# Patient Record
Sex: FEMALE | Employment: FULL TIME | URBAN - METROPOLITAN AREA
[De-identification: names, ages, dates, MRNs, and addresses within clinical notes are randomized per-mention and may not be internally consistent; named-entity substitution may affect disease eponyms.]

---

## 2021-04-27 ENCOUNTER — HOSPITAL ENCOUNTER (OUTPATIENT)
Dept: PHYSICAL THERAPY | Age: 51
Discharge: HOME | End: 2021-04-27
Payer: MEDICAID

## 2021-04-27 PROCEDURE — 97162 PT EVAL MOD COMPLEX 30 MIN: CPT
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
## 2021-04-27 NOTE — PROGRESS NOTES
PHYSICAL THERAPY - DAILY TREATMENT NOTE     Patient Name: Michael Sheets        Date: 2021  : 1970   YES Patient  Verified  Visit #:   1     Insurance: Payor: Aixa De Jesus / Plan: Aliza Ko / Product Type: Managed Care Medicaid /      In time: 900 Out time: 1000   Total Treatment Time: 60     Medicare/[facility] Time Tracking (below)   Total Timed Codes (min):   1:1 Treatment Time:       TREATMENT AREA =  Neck pain [M54.2]  Back pain [M54.9]  Concussion [S06.0X9A]    SUBJECTIVE    Pain Level (on 0 to 10 scale):  7  / 10   Medication Changes/New allergies or changes in medical history, any new surgeries or procedures?     NO    If yes, update Summary List   Subjective Functional Status/Changes:  []  No changes reported       See POC         OBJECTIVE  Modalities Rationale:     decrease pain and increase tissue extensibility to improve patient's ability to perform ADL's w/o pain      min [] Estim, type/location:                                      []  att     []  unatt     []  w/US     []  w/ice    []  w/heat    min []  Mechanical Traction: type/lbs                   []  pro   []  sup   []  int   []  cont    []  before manual    []  after manual    min []  Ultrasound, settings/location:      min []  Iontophoresis w/ dexamethasone, location:                                               []  take home patch       []  in clinic   10 min []  Ice     [x]  Heat    location/position: Supine to C/S and L/S    min []  Vasopneumatic Device, press/temp:     min []  Other:    [x] Skin assessment post-treatment (if applicable):    [x]  intact    [x]  redness- no adverse reaction     []redness - adverse reaction:      Billed With/As:   [] TE   [] TA   [] Neuro   [] Self Care Patient Education: [x] Review HEP    [] Progressed/Changed HEP based on:   [] positioning   [] body mechanics   [] transfers   [] heat/ice application    [] other:        Other Objective/Functional Measures:    See POC  See TE Flow sheet     Post Treatment Pain Level (on 0 to 10) scale:   4-5  / 10     ASSESSMENT    Assessment/Changes in Function:     See POC     []  See Progress Note/Recertification   Patient will continue to benefit from skilled PT services to modify and progress therapeutic interventions, address functional mobility deficits, address ROM deficits, address strength deficits, analyze and address soft tissue restrictions, analyze and cue movement patterns, analyze and modify body mechanics/ergonomics and assess and modify postural abnormalities to attain remaining goals.       Progress toward goals / Updated goals:    See POC     PLAN    [x]  Upgrade activities as tolerated YES Continue plan of care   []  Discharge due to :    []  Other:      Therapist: Cece Mathews PT    Date: 4/27/2021 Time: 8:28 AM     Future Appointments   Date Time Provider Trey An   4/27/2021  9:00 AM Hope Curtis PT [facility]

## 2021-05-03 ENCOUNTER — HOSPITAL ENCOUNTER (OUTPATIENT)
Dept: PHYSICAL THERAPY | Age: 51
Discharge: HOME | End: 2021-05-03
Payer: MEDICAID

## 2021-05-03 PROCEDURE — 97112 NEUROMUSCULAR REEDUCATION: CPT

## 2021-05-03 PROCEDURE — 97110 THERAPEUTIC EXERCISES: CPT

## 2021-05-03 PROCEDURE — 97535 SELF CARE MNGMENT TRAINING: CPT

## 2021-05-06 ENCOUNTER — HOSPITAL ENCOUNTER (OUTPATIENT)
Dept: PHYSICAL THERAPY | Age: 51
End: 2021-05-06
Payer: MEDICAID

## 2021-05-07 ENCOUNTER — HOSPITAL ENCOUNTER (OUTPATIENT)
Dept: PHYSICAL THERAPY | Age: 51
Discharge: HOME | End: 2021-05-07
Payer: MEDICAID

## 2021-05-07 PROCEDURE — 97530 THERAPEUTIC ACTIVITIES: CPT

## 2021-05-07 PROCEDURE — 97110 THERAPEUTIC EXERCISES: CPT

## 2021-05-07 PROCEDURE — 97112 NEUROMUSCULAR REEDUCATION: CPT

## 2021-05-07 NOTE — PROGRESS NOTES
PHYSICAL THERAPY - DAILY TREATMENT NOTE     Patient Name: Lo Bee        Date: 2021  : 1970   YES Patient  Verified  Visit #:   3   of   12  Insurance: Payor: Mandy Day / Plan: Camron Sample / Product Type: Managed Care Medicaid /      In time: 11:50 Out time: 12:40   Total Treatment Time: 50       TREATMENT AREA =  Neck pain [M54.2]  Back pain [M54.9]  Concussion [S06.0X9A]    SUBJECTIVE    Pain Level (on 0 to 10 scale):  6  / 10   Medication Changes/New allergies or changes in medical history, any new surgeries or procedures? NO    If yes, update Summary List   Subjective Functional Status/Changes:  []  No changes reported     \"I had a bad night last night and it really hurts today and it's really stiff. \"   Functional improvements:   Functional impairments: pain, ADLs,   Subjective Functional Status/Changes:  [x]  No changes reported       OBJECTIVE  Modalities Rationale:   decrease inflammation and decrease pain to improve patient's ability to amb without pain      min [] Estim, type/location:                                      []  att     []  unatt     []  w/US     []  w/ice    []  w/heat    min []  Mechanical Traction: type/lbs                   []  pro   []  sup   []  int   []  cont    []  before manual    []  after manual    min []  Ultrasound, settings/location:      min []  Iontophoresis w/ dexamethasone, location:                                               []  take home patch       []  in clinic   10 min []  Ice     [x]  Heat    location/position: Hooklying, back, neck    min []  Vasopneumatic Device, press/temp:     min []  Other:    [x] Skin assessment post-treatment (if applicable):    [x]  intact    []  redness- no adverse reaction     []redness  adverse reaction:      22 min Therapeutic Exercise:  [x]  See flow sheet   Rationale:  increase ROM and increase strength to improve the patients ability to participate in ADLs without pain     10 min Therapeutic Activity: [x]  See flow sheet   Rationale:  increase ROM and increase strength to improve the patients ability to participate in ADLs without pain     8 min Neuromuscular Re-ed: [x]  See flow sheet   Rationale:  increase strength and improve coordination to improve the patients ability to participate in ADLs without pain         Billed With/As:   [x] TE   [] TA   [] Neuro   [] Self Care Patient Education: [x] Review HEP    [] Progressed/Changed HEP based on:   [] positioning   [] body mechanics   [] transfers   [] heat/ice application    [] other:        Other Objective/Functional Measures: Added in ulnar nerve glides today     Post Treatment Pain Level (on 0 to 10) scale:   4  / 10     ASSESSMENT    Assessment/Changes in Function:     Difficulty tolerating lumbar stretching activities today, stated that they increased her pain significantly  Pt reports seeing doctor to manage concussion symptoms which are still affecting her daily and significantly     []  See Progress Note/Recertification   Patient will continue to benefit from skilled PT services to modify and progress therapeutic interventions, address functional mobility deficits, address ROM deficits, address strength deficits, analyze and address soft tissue restrictions and analyze and cue movement patterns to attain remaining goals.       Progress toward goals / Updated goals:    Fair Progress to    [x] STG    [] LTG  1 as shown by pain levels     PLAN    [x]  Upgrade activities as tolerated YES Continue plan of care   []  Discharge due to :    []  Other:      Therapist: Jennifer Doe PT    Date: 4/7/9621 Time: 12:00 PM     Future Appointments   Date Time Provider Trey An   5/10/2021  3:45 PM Robie Crook [facility]   5/13/2021  3:45 PM Krysten Hemming, PTA [facility]   5/17/2021  3:45 PM Robie Crook [facility]   5/20/2021  3:45 PM Krysten Hemming, PTA [facility]   5/24/2021  3:45 PM Robie Crook [facility]   5/27/2021 4:30 PM Mohsen Mcfarland, MALU [facility] [address]

## 2021-05-13 ENCOUNTER — HOSPITAL ENCOUNTER (OUTPATIENT)
Dept: PHYSICAL THERAPY | Age: 51
End: 2021-05-13
Payer: MEDICAID

## 2021-05-14 ENCOUNTER — HOSPITAL ENCOUNTER (OUTPATIENT)
Dept: PHYSICAL THERAPY | Age: 51
Discharge: HOME | End: 2021-05-14
Payer: MEDICAID

## 2021-05-14 PROCEDURE — 97112 NEUROMUSCULAR REEDUCATION: CPT

## 2021-05-14 PROCEDURE — 97530 THERAPEUTIC ACTIVITIES: CPT

## 2021-05-14 PROCEDURE — 97014 ELECTRIC STIMULATION THERAPY: CPT

## 2021-05-14 PROCEDURE — 97110 THERAPEUTIC EXERCISES: CPT

## 2021-05-14 NOTE — PROGRESS NOTES
PHYSICAL THERAPY - DAILY TREATMENT NOTE     Patient Name: Jacy Enter        Date: 2021  : 1970   YES Patient  Verified  Visit #:     Insurance: Payor: Viola Roach / Plan: Sruthi Bergman / Product Type: Managed Care Medicaid /      In time: 11:18 Out time: [id number]   Total Treatment Time: 58       TREATMENT AREA =  Neck pain [M54.2]  Back pain [M54.9]  Concussion [S06.0X9A]    SUBJECTIVE    Pain Level (on 0 to 10 scale): 7    / 10   Medication Changes/New allergies or changes in medical history, any new surgeries or procedures?     NO    If yes, update Summary List   Subjective Functional Status/Changes:  []  No changes reported       Functional improvements: walked ~ 4 blocks (20 min), decreasing frequency of HA   Functional impairments:difficulty sleeping- waking frequently, increased low back pain ,vision still slightly blurrie         OBJECTIVE  Modalities Rationale:   decrease inflammation and decrease pain to improve patient's ability to amb without pain     10 min [x] Estim, type/location: IFC low back;  -CS                                     []  att     [x]  unatt     []  w/US     []  w/ice    [x]  w/heat    min []  Mechanical Traction: type/lbs                   []  pro   []  sup   []  int   []  cont    []  before manual    []  after manual    min []  Ultrasound, settings/location:      min []  Iontophoresis w/ dexamethasone, location:                                               []  take home patch       []  in clinic    min []  Ice     []  Heat    location/position:     min []  Vasopneumatic Device, press/temp:     min []  Other:    [x] Skin assessment post-treatment (if applicable):    [x]  intact    []  redness- no adverse reaction     []redness  adverse reaction:      23 min Therapeutic Exercise:  [x]  See flow sheet   Rationale:  increase ROM and increase strength to improve the patients ability to participate in ADLs without pain     9 min Therapeutic Activity: [x]  See flow sheet   Rationale:  increase ROM and increase strength to improve the patients ability to participate in ADLs without pain     16 min Neuromuscular Re-ed: [x]  See flow sheet   Rationale:  increase strength and improve coordination to improve the patients ability to participate in ADLs without pain         10Billed With/As:   [x] TE   [] TA   [] Neuro   [] Self Care Patient Education: [x] Review HEP    [] Progressed/Changed HEP based on:   [] positioning   [] body mechanics   [] transfers   [] heat/ice application    [] other:        Other Objective/Functional Measures:  AROM: RR: 30%-pain, LR: 40%     Post Treatment Pain Level (on 0 to 10) scale:  5   / 10     ASSESSMENT    Assessment/Changes in Function:   Pt presenting with moderate restirictions in cervical AROM bilateral cervical rotation  Add IFC with MH to low back to decreased mm tone/pain  Pt requires extra time for position changes secondary to muscle guarding     []  See Progress Note/Recertification   Patient will continue to benefit from skilled PT services to modify and progress therapeutic interventions, address functional mobility deficits, address ROM deficits, address strength deficits, analyze and address soft tissue restrictions and analyze and cue movement patterns to attain remaining goals.       Progress toward goals / Updated goals:    Fair Progress to    [x] STG    [] LTG       PLAN    [x]  Upgrade activities as tolerated YES Continue plan of care   []  Discharge due to :    []  Other:      Therapist: Ninfa Barragan PTA    Date: 5/14/2021 Time: 12:15 pm     Future Appointments   Date Time Provider Trey An   5/14/2021 11:15 AM Cynthia Shaw PTA [facility]   5/17/2021  3:45 PM Oscar Eland [facility]   5/20/2021  3:45 PM Kurtis Colbert PTA [facility]   5/24/2021  3:45 PM Oscar Eland [facility]   5/27/2021  4:30 PM Cynthia Shaw PTA [facility]

## 2021-05-17 ENCOUNTER — HOSPITAL ENCOUNTER (OUTPATIENT)
Dept: PHYSICAL THERAPY | Age: 51
Discharge: HOME | End: 2021-05-17
Payer: MEDICAID

## 2021-05-17 PROCEDURE — 97014 ELECTRIC STIMULATION THERAPY: CPT

## 2021-05-17 PROCEDURE — 97110 THERAPEUTIC EXERCISES: CPT

## 2021-05-17 PROCEDURE — 97530 THERAPEUTIC ACTIVITIES: CPT

## 2021-05-17 PROCEDURE — 97112 NEUROMUSCULAR REEDUCATION: CPT

## 2021-05-17 NOTE — PROGRESS NOTES
PHYSICAL THERAPY - DAILY TREATMENT NOTE    Patient Name: Jacy Enter        Date: 2021  : 1970    Patient  Verified: YES  Visit #:   5   of   12  Insurance: Payor: Viola Roach / Plan: Sruthi Bergman / Product Type: Managed Care Medicaid /      In time: 3:50 Out time: 4:55   Total Treatment Time: 65     Medicare Time Tracking (below)   Total Timed Codes (min):  - 1:1 Treatment Time:  -     TREATMENT AREA/ DIAGNOSIS = Neck pain [M54.2]  Back pain [M54.9]  Concussion [S06.0X9A]    SUBJECTIVE  Pain Level (on 0 to 10 scale):  7  / 10   Medication Changes/New allergies or changes in medical history, any new surgeries or procedures?     NO    If yes, update Summary List   Subjective Functional Status/Changes:  []  No changes reported     Pt reports having pain in the low back with ADLs      OBJECTIVE  Modalities Rationale:     decrease inflammation and decrease pain to improve patient's ability to perform ADLs without pain    10 min [x] Estim, type/location: IFC L/S                                     []  att     []  unatt     []  w/US     []  w/ice    []  w/heat    min []  Mechanical Traction: type/lbs                   []  pro   []  sup   []  int   []  cont    []  before manual    []  after manual    min []  Ultrasound, settings/location:      min []  Iontophoresis w/ dexamethasone, location:                                               []  take home patch       []  in clinic   - min []  Ice     [x]  Heat    location/position:     min []  Vasopneumatic Device, press/temp:     min []  Other:    [] Skin assessment post-treatment (if applicable):    []  intact    []  redness- no adverse reaction     []redness  adverse reaction:        20 min Therapeutic Exercise:  [x]  See flow sheet   Rationale:      increase ROM and increase strength to improve the patients ability to perform ADLs without pain       15 min Therapeutic Activity: [x]  See flow sheet   Rationale:    increase strength and improve coordination to improve the patients ability to perform ADLs without pain      20 min Neuromuscular Re-ed: [x]  See flow sheet   Rationale:    improve balance and increase proprioception to improve the patients ability to perform ADLs without pain      Billed With/As:   [x] TE   [] TA   [] Neuro   [] Self Care Patient Education: [x] Review HEP    [] Progressed/Changed HEP based on:   [] positioning   [] body mechanics   [] transfers   [] heat/ice application    [] other:        Other Objective/Functional Measures:    TTP to L/S. Post Treatment Pain Level (on 0 to 10) scale:   5  / 10     ASSESSMENT  Assessment/Changes in Function:     Pt still requires cueing for proper mechanics with exercises. []  See Progress Note/Recertification   Patient will continue to benefit from skilled PT services to modify and progress therapeutic interventions, address functional mobility deficits, address ROM deficits, address strength deficits, analyze and address soft tissue restrictions, analyze and cue movement patterns and analyze and modify body mechanics/ergonomics to attain remaining goals.    Progress toward goals / Updated goals:    Good Progress to    [] STG    [x] LTG  1 as shown by improved overall activity tolerance      PLAN  [x]  Upgrade activities as tolerated YES Continue plan of care   []  Discharge due to :    []  Other:      Therapist: Eli Mc DPT     Date: 5/17/2021 Time: 4:14 PM        Future Appointments   Date Time Provider Trey An   5/20/2021  3:45 PM Adele Landa [facility]   5/24/2021  3:45 PM Vanita Chen [facility]   5/27/2021  4:30 PM Evelin Lambert PTA [facility]

## 2021-05-20 ENCOUNTER — HOSPITAL ENCOUNTER (OUTPATIENT)
Dept: PHYSICAL THERAPY | Age: 51
Discharge: HOME | End: 2021-05-20
Payer: MEDICAID

## 2021-05-20 PROCEDURE — 97535 SELF CARE MNGMENT TRAINING: CPT

## 2021-05-20 PROCEDURE — 97110 THERAPEUTIC EXERCISES: CPT

## 2021-05-20 PROCEDURE — 97014 ELECTRIC STIMULATION THERAPY: CPT

## 2021-05-20 PROCEDURE — 97112 NEUROMUSCULAR REEDUCATION: CPT

## 2021-05-20 NOTE — PROGRESS NOTES
PHYSICAL THERAPY - DAILY TREATMENT NOTE    Patient Name: Christina Bush        Date: 2021  : 1970    Patient  Verified: YES  Visit #:   6   of   12  Insurance: Payor: Uriah Boothe / Plan: Kristen Miles / Product Type: Managed Care Medicaid /      In time: 3:45 Out time: 4:40   Total Treatment Time: 55     Medicare Time Tracking (below)   Total Timed Codes (min):  - 1:1 Treatment Time:  -     TREATMENT AREA/ DIAGNOSIS = Neck pain [M54.2]  Back pain [M54.9]  Concussion [S06.0X9A]    SUBJECTIVE  Pain Level (on 0 to 10 scale):  3  / 10   Medication Changes/New allergies or changes in medical history, any new surgeries or procedures? NO    If yes, update Summary List   Subjective Functional Status/Changes:  []  No changes reported     Pt reports overall improvement in pain today.  Pt states she is able to perform more ADLs      OBJECTIVE  Modalities Rationale:     decrease inflammation and decrease pain to improve patient's ability to perform ADLs without pain    10 min [x] Estim, type/location:  IFC to L/S                                    []  att     []  unatt     []  w/US     []  w/ice    []  w/heat    min []  Mechanical Traction: type/lbs                   []  pro   []  sup   []  int   []  cont    []  before manual    []  after manual    min []  Ultrasound, settings/location:      min []  Iontophoresis w/ dexamethasone, location:                                               []  take home patch       []  in clinic   - min [x]  Ice     []  Heat    location/position: C/S and L/S with IFC    min []  Vasopneumatic Device, press/temp:     min []  Other:    [] Skin assessment post-treatment (if applicable):    []  intact    []  redness- no adverse reaction     []redness  adverse reaction:        15 min Therapeutic Exercise:  [x]  See flow sheet   Rationale:      increase ROM and increase strength to improve the patients ability to perform ADLs without pain         15 min Neuromuscular Re-ed: [x]  See flow sheet   Rationale:    improve coordination and increase proprioception to improve the patients ability to perform ADLs without pain      15 min Self Care: Pathology education. Pain neuroscience education. Rationale:    education to improve the patients ability to self manage symptoms     Billed With/As:   [x] TE   [] TA   [] Neuro   [] Self Care Patient Education: [x] Review HEP    [] Progressed/Changed HEP based on:   [] positioning   [] body mechanics   [] transfers   [] heat/ice application    [] other:        Other Objective/Functional Measures:    Improved TrA activation   Post Treatment Pain Level (on 0 to 10) scale:   3  / 10     ASSESSMENT  Assessment/Changes in Function:     Improved overall activity tolerance with exercises and decrease in pain level     []  See Progress Note/Recertification   Patient will continue to benefit from skilled PT services to modify and progress therapeutic interventions, address functional mobility deficits, address ROM deficits, address strength deficits, analyze and address soft tissue restrictions and analyze and cue movement patterns to attain remaining goals.    Progress toward goals / Updated goals:    Good Progress to    [] STG    [x] LTG  1 as shown by improved overall pain levels with ADLs     PLAN  [x]  Upgrade activities as tolerated YES Continue plan of care   []  Discharge due to :    []  Other:      Therapist: Daryle Peach ,DPT     Date: 5/20/2021 Time: 9:42 AM        Future Appointments   Date Time Provider Trey An   5/20/2021  3:45 PM Cecillia Durand [facility]   5/24/2021  3:45 PM Cecillia Durand [facility]   5/27/2021  4:30 PM Cindy Key PTA [facility]

## 2021-05-24 ENCOUNTER — HOSPITAL ENCOUNTER (OUTPATIENT)
Dept: PHYSICAL THERAPY | Age: 51
Discharge: HOME | End: 2021-05-24
Payer: MEDICAID

## 2021-05-24 PROCEDURE — 97110 THERAPEUTIC EXERCISES: CPT

## 2021-05-24 PROCEDURE — 97112 NEUROMUSCULAR REEDUCATION: CPT

## 2021-05-24 PROCEDURE — 97530 THERAPEUTIC ACTIVITIES: CPT

## 2021-05-24 PROCEDURE — 97014 ELECTRIC STIMULATION THERAPY: CPT

## 2021-05-24 NOTE — PROGRESS NOTES
PHYSICAL THERAPY - DAILY TREATMENT NOTE    Patient Name: Royal Oro        Date: 2021  : 1970    Patient  Verified: YES  Visit #:   7   of   12  Insurance: Payor: Manasa Baker / Plan: Adalid Correa / Product Type: Managed Care Medicaid /      In time: 3:45 Out time: 4:45   Total Treatment Time: 60     Medicare Time Tracking (below)   Total Timed Codes (min):  - 1:1 Treatment Time:  -     TREATMENT AREA/ DIAGNOSIS = Neck pain [M54.2]  Back pain [M54.9]  Concussion [S06.0X9A]    SUBJECTIVE  Pain Level (on 0 to 10 scale):  2-3  / 10   Medication Changes/New allergies or changes in medical history, any new surgeries or procedures?     NO    If yes, update Summary List   Subjective Functional Status/Changes:  []  No changes reported     See PN      OBJECTIVE  Modalities Rationale:     decrease inflammation and increase tissue extensibility to improve patient's ability to perform ADLs without pain    10 min [x] Estim, type/location:  IFC and heat                                    []  att     []  unatt     []  w/US     []  w/ice    []  w/heat    min []  Mechanical Traction: type/lbs                   []  pro   []  sup   []  int   []  cont    []  before manual    []  after manual    min []  Ultrasound, settings/location:      min []  Iontophoresis w/ dexamethasone, location:                                               []  take home patch       []  in clinic   - min []  Ice     [x]  Heat    location/position:     min []  Vasopneumatic Device, press/temp:     min []  Other:    [] Skin assessment post-treatment (if applicable):    []  intact    []  redness- no adverse reaction     []redness  adverse reaction:        20 min Therapeutic Exercise:  [x]  See flow sheet   Rationale:      increase ROM and increase strength to improve the patients ability to perform ADLs without pain         15 min Therapeutic Activity: [x]  See flow sheet   Rationale:    functional activiites to improve the patients ability to perform ADLs without pain      15 min Neuromuscular Re-ed: [x]  See flow sheet   Rationale:    improve coordination, improve balance and increase proprioception to improve the patients ability to perform ADLs without pain      Billed With/As:   [x] TE   [] TA   [] Neuro   [] Self Care Patient Education: [x] Review HEP    [] Progressed/Changed HEP based on:   [] positioning   [] body mechanics   [] transfers   [] heat/ice application    [] other:        Other Objective/Functional Measures:      See PN   Post Treatment Pain Level (on 0 to 10) scale:   2-3  / 10     ASSESSMENT  Assessment/Changes in Function:     See PN     []  See Progress Note/Recertification   Patient will continue to benefit from skilled PT services to modify and progress therapeutic interventions, address functional mobility deficits, address ROM deficits, address strength deficits, analyze and address soft tissue restrictions and analyze and cue movement patterns to attain remaining goals.    Progress toward goals / Updated goals:    See PN     PLAN  [x]  Upgrade activities as tolerated YES Continue plan of care   []  Discharge due to :    []  Other:      Therapist: Galina Barakat DPT     Date: 5/24/2021 Time: 9:09 AM        Future Appointments   Date Time Provider Trey An   5/24/2021  3:45 PM Aaron Starr [facility]   5/27/2021  4:30 PM Hayden Steele PTA [facility]

## 2021-05-24 NOTE — PROGRESS NOTES
[address] [facility] PHYSICAL THERAPY AT [facility] [address] [place], [address]  Phone: [phone]  Fax: [phone]  PROGRESS NOTE  Patient Name: Jacques Shoemaker : 1970   Treatment/Medical Diagnosis: Neck pain [M54.2]  Back pain [M54.9]  Concussion [S06.0X9A]   Referral Source: Alex Hoffman DO     Date of Initial Visit: 2021 Attended Visits: 1 Missed Visits: 7     SUMMARY OF TREATMENT  PT consisted of manual therapy techniques, therapeutic exercises, and modalities to improve strength, improve mobility, decrease pain, and improve overall function. CURRENT STATUS  Pt reports an overall improvement of 50-60% since onset of care. Pt states she is still having soreness with activity. Pt reports no having a headache for a few days. Pt states she is still having pain with some transitional movements and pain in the low back after squatting and bending. Pt showing great tolerance and progression with current exercises and would benefit from continued PT services. Goal/Measure of Progress Goal Met? 1. Patient will be independent with advanced and progressive HEP to prevent reoccurrence of injury and allow for full return to all ADL's   Status at last Eval: - Current Status: Not indepent no   2. Patient to demonstrate improved L/S and C/S ROM WFL and </= to 2/10 pain to allow for standing and walking > 20 min to allow patient to return to work activities   Status at last Eval: 2-3 Current Status: 2-3 no   3. Patient will decrease DHI to </= to 39 indicating improved perception of dizziness with ADL's   Status at last Eval: 58 Current Status: DNA n/a   4. Patient to improve MOCA to >/= 26 indicating improved symptoms in relation to concussion   Status at last Eval: 22 Current Status: DNA n/a     4. Increase FOTO to >  45 indicating improved functional mobility with ADL's.      Status at last Eval: 35 Current Status: 39 progressing     New Goals to be achieved in __4__  weeks:  1. Continue goals above   2.  -   3.  -   4.  -       RECOMMENDATIONS  Pt to continue PT 2x a week for another 4 weeks to improve overall pain level and functional activity tolerance with ADLs. If you have any questions/comments please contact us directly at [phone]. Thank you for allowing us to assist in the care of your patient. Therapist Signature: Margie Tuttle Date: 5/24/2021     Time: 9:07 AM   NOTE TO PHYSICIAN:  PLEASE COMPLETE THE ORDERS BELOW AND FAX TO   [facility] at [address]: [phone]. If you are unable to process this request in 24 hours please contact our office: [phone].    ___ I have read the above report and request that my patient continue as recommended.   ___ I have read the above report and request that my patient continue therapy with the following changes/special instructions:_________________________________________________________   ___ I have read the above report and request that my patient be discharged from therapy.      Physician Signature:        Date:       Time:        Ernie Campos DO

## 2021-05-26 ENCOUNTER — HOSPITAL ENCOUNTER (OUTPATIENT)
Dept: PHYSICAL THERAPY | Age: 51
End: 2021-05-26
Payer: MEDICAID

## 2021-05-27 ENCOUNTER — HOSPITAL ENCOUNTER (OUTPATIENT)
Dept: PHYSICAL THERAPY | Age: 51
End: 2021-05-27
Payer: MEDICAID

## 2021-06-03 ENCOUNTER — HOSPITAL ENCOUNTER (OUTPATIENT)
Dept: PHYSICAL THERAPY | Age: 51
End: 2021-06-03
Payer: MEDICAID

## 2021-06-04 ENCOUNTER — HOSPITAL ENCOUNTER (OUTPATIENT)
Dept: PHYSICAL THERAPY | Age: 51
Discharge: HOME | End: 2021-06-04
Payer: MEDICAID

## 2021-06-04 PROCEDURE — 97112 NEUROMUSCULAR REEDUCATION: CPT

## 2021-06-04 PROCEDURE — 97530 THERAPEUTIC ACTIVITIES: CPT

## 2021-06-04 PROCEDURE — 97110 THERAPEUTIC EXERCISES: CPT

## 2021-06-04 NOTE — PROGRESS NOTES
PHYSICAL THERAPY - DAILY TREATMENT NOTE     Patient Name: Maxx Russo        Date: 2021  : 1970   YES Patient  Verified  Visit #:     Insurance: Payor: Karla Cruz / Plan: [email or web address] / Product Type: Managed Care Medicaid /      In time: 2:16 PM Out time: 3:20 PM   Total Treatment Time: 64 min     Medicare/BCBS Time Tracking (below)   Total Timed Codes (min):  NA 1:1 Treatment Time:  NA     TREATMENT AREA =  Neck pain [M54.2]  Back pain [M54.9]  Concussion [S06.0X9A]    SUBJECTIVE    Pain Level (on 0 to 10 scale):  4  / 10   Medication Changes/New allergies or changes in medical history, any new surgeries or procedures? NO    If yes, update Summary List   Subjective Functional Status/Changes:  []  No changes reported       Functional improvements: Patient reports that her back is getting better, but some pain here and there. Headache pain is good/gone. Functional impairments: Patient reports area of tenderness and palpable muscle tension left UT area. Pt had procedure on left first toe nail yesterday and painful now, especially with weightbearing. OBJECTIVE    30 min Therapeutic Exercise:  [x]  See flow sheet. Added and patient performed doorway pectoral stretches with posture 3 x 20\"; seated trunk flexion stretches for 3 x 20 seconds; seated HS stretches with posture 2 x 30\" L and R; green T-band resisted bilat shldr ext/scapular retraction in standing   Rationale:      increase ROM, increase strength and increase proprioception to improve the patients ability to perform ADLs, posture and body mechanics. 15 min Therapeutic Activity: [x]  See flow sheet. Added SLS balance with core bracing (min UE use) hip/knee flex marches. Self-DTM/TP releases with Theracane for left upper/mid scapular muscle areas.    Rationale:      increase ROM, increase strength, improve coordination, improve balance and increase proprioception to improve the patients ability to perform ADLs, posture and body mechanics. 15 min Neuromuscular Re-ed: [x]  See flow sheet. Added quadriped with core bracing and LE raises left x 10, right x 10, and reciprocal x 6 each L/R; sitbacks/ups with core bracing. Rationale:      increase strength, improve coordination, improve balance and increase proprioception to improve the patients ability to perform  ADLs, posture and body mechanics. Billed With/As:   [x] TE   [x] TA   [x] Neuro   [] Self Care Patient Education: [x] Review HEP    [x] Progressed/Changed HEP based on:   [x] positioning   [x] body mechanics   [] transfers   [] heat/ice application    [] other:        Other Objective/Functional Measures:    Good sitting posture and LE ROM/flexibility. Post Treatment Pain Level (on 0 to 10) scale:   3-4  / 10     ASSESSMENT    Assessment/Changes in Function:     Good form with exercises. Palpable hypertonicity with tenderness left UT. Decreased behind head reach with left UE with attempt at alternate levator stretch. []  See Progress Note/Recertification   Patient will continue to benefit from skilled PT services to modify and progress therapeutic interventions, address functional mobility deficits, address ROM deficits, address strength deficits, analyze and address soft tissue restrictions, analyze and cue movement patterns, analyze and modify body mechanics/ergonomics and assess and modify postural abnormalities to attain remaining goals. Progress toward goals / Updated goals:    Good Progress to    [x] STG #2    [x] LTG  #1 as shown by Decreased pain scale ratings; progression of TE/TA with correct form and without increase in symptom complaints.        PLAN    [x]  Upgrade activities as tolerated YES Continue plan of care   []  Discharge due to :    []  Other:      Therapist: Marysol Verma, PT    Date: 6/4/2021 Time: 4:20 PM     Future Appointments   Date Time Provider Trey An   6/7/2021  4:30 PM Robbin Jackson [facility]   6/10/2021  4:30 PM Lesle Shallow, PTA [facility]   6/14/2021  4:30 PM [facility]   6/17/2021  4:30 PM Lesle Shallow, PTA [facility]   6/21/2021  4:30 PM [facility]   [facility]/24/2021  [facility]   6/28/2021  4:30 PM Randi Glenn [facility]   7/1/2021  4:30 PM Lesle Shallow, PTA [facility]

## 2021-06-10 ENCOUNTER — HOSPITAL ENCOUNTER (OUTPATIENT)
Dept: PHYSICAL THERAPY | Age: 51
End: 2021-06-10
Payer: MEDICAID

## 2021-06-11 ENCOUNTER — HOSPITAL ENCOUNTER (OUTPATIENT)
Dept: PHYSICAL THERAPY | Age: 51
Discharge: HOME | End: 2021-06-11
Payer: MEDICAID

## 2021-06-11 PROCEDURE — 97112 NEUROMUSCULAR REEDUCATION: CPT

## 2021-06-11 PROCEDURE — 97535 SELF CARE MNGMENT TRAINING: CPT

## 2021-06-11 PROCEDURE — 97014 ELECTRIC STIMULATION THERAPY: CPT

## 2021-06-11 PROCEDURE — 97110 THERAPEUTIC EXERCISES: CPT

## 2021-06-11 NOTE — PROGRESS NOTES
PHYSICAL THERAPY - DAILY TREATMENT NOTE    Patient Name: Allana Gaucher        Date: 2021  : 1970    Patient  Verified: YES  Visit #:     Insurance: Payor: Mukesh Llyod / Plan: Jake Chandler / Product Type: Managed Care Medicaid /      In time: 1:40 Out time: 2:50   Total Treatment Time: 70     Medicare Time Tracking (below)   Total Timed Codes (min):  - 1:1 Treatment Time:  -     TREATMENT AREA/ DIAGNOSIS = Neck pain [M54.2]  Back pain [M54.9]  Concussion [S06.0X9A]    SUBJECTIVE  Pain Level (on 0 to 10 scale):  3  / 10   Medication Changes/New allergies or changes in medical history, any new surgeries or procedures? NO    If yes, update Summary List   Subjective Functional Status/Changes:  []  No changes reported     Pt reports still having pain in her back with walking, after waking up, and sometimes comes on randomly. Pt reports L hand and  weakness intermittently over the past month.  Reports some numbness and tingling in the L hand      OBJECTIVE  Modalities Rationale:     decrease inflammation and decrease pain to improve patient's ability to perform ADLs without pain    10 min [] Estim, type/location: IFC to L/S                                    []  att     []  unatt     []  w/US     []  w/ice    []  w/heat    min []  Mechanical Traction: type/lbs                   []  pro   []  sup   []  int   []  cont    []  before manual    []  after manual    min []  Ultrasound, settings/location:      min []  Iontophoresis w/ dexamethasone, location:                                               []  take home patch       []  in clinic   - min []  Ice     [x]  Heat    location/position:     min []  Vasopneumatic Device, press/temp:     min []  Other:    [] Skin assessment post-treatment (if applicable):    []  intact    []  redness- no adverse reaction     []redness  adverse reaction:        20 min Therapeutic Exercise:  [x]  See flow sheet   Rationale:      increase ROM, increase strength and improve coordination to improve the patients ability to perform ADLs without pain       5 min Manual Therapy: PA sacral mobs    Rationale:      increase ROM and increase tissue extensibility to improve patient's ability to perform ADLs without pain  The manual therapy interventions were performed at a separate and distinct time from the therapeutic activities interventions       min Therapeutic Activity: [x]  See flow sheet   Rationale:    functional activities to improve the patients ability to perform ADLs without pain      20 min Neuromuscular Re-ed: [x]  See flow sheet   Rationale:    improve coordination, improve balance and increase proprioception to improve the patients ability to perform ADLs without pain      15 min Self Care: Education to for improved HEP. [facility] for self management of symptoms    Rationale:    education to improve the patients ability to self manage symptoms     Billed With/As:   [x] TE   [] TA   [] Neuro   [] Self Care Patient Education: [x] Review HEP    [] Progressed/Changed HEP based on:   [] positioning   [] body mechanics   [] transfers   [] heat/ice application    [] other:        Other Objective/Functional Measures:    TTP to L side of sacrum   Post Treatment Pain Level (on 0 to 10) scale:   3  / 10     ASSESSMENT  Assessment/Changes in Function:     Pt showing improved activity tolerance. Pt required verbal cueing to not pull as hard with stretching so she doesn't have discomfort      []  See Progress Note/Recertification   Patient will continue to benefit from skilled PT services to modify and progress therapeutic interventions, address functional mobility deficits, address ROM deficits, address strength deficits and analyze and address soft tissue restrictions to attain remaining goals.    Progress toward goals / Updated goals:    Good Progress to    [] STG    [x] LTG  1 as shown by overall improvements in pain     PLAN  [x]  Upgrade activities as tolerated YES Continue plan of care   []  Discharge due to :    []  Other:      Therapist: Shasta Sky DPT     Date: 6/11/2021 Time: 11:43 AM        Future Appointments   Date Time Provider Trey An   6/11/2021  1:30 PM Gunnar Butte [facility]   6/14/2021  4:30 PM Gunnar Butte [facility]   6/17/2021  4:30 PM Papito Polanco, PTA [facility]   6/21/2021  4:30 PM Gunnar Butte [facility]   6/24/2021  4:30 PM Hinda Lesch, PTA [facility]   6/28/2021  4:30 PM Gunnar Butte [facility]   7/1/2021  4:30 PM Papito Polanco PTA [facility]

## 2021-06-21 ENCOUNTER — HOSPITAL ENCOUNTER (OUTPATIENT)
Dept: PHYSICAL THERAPY | Age: 51
Discharge: HOME | End: 2021-06-21
Payer: MEDICAID

## 2021-06-21 PROCEDURE — 97014 ELECTRIC STIMULATION THERAPY: CPT

## 2021-06-21 PROCEDURE — 97110 THERAPEUTIC EXERCISES: CPT

## 2021-06-21 PROCEDURE — 97112 NEUROMUSCULAR REEDUCATION: CPT

## 2021-06-21 PROCEDURE — 97530 THERAPEUTIC ACTIVITIES: CPT

## 2021-06-21 NOTE — PROGRESS NOTES
PHYSICAL THERAPY - DAILY TREATMENT NOTE    Patient Name: Stella Felipe        Date: 2021  : 1970    Patient  Verified: YES  Visit #:   10   of   14  Insurance: Payor: Dai Foreman / Plan: Janell Nuñez / Product Type: Managed Care Medicaid /      In time: 4:30 Out time: 5:30   Total Treatment Time: 60     Medicare Time Tracking (below)   Total Timed Codes (min):  - 1:1 Treatment Time:  -     TREATMENT AREA/ DIAGNOSIS = Neck pain [M54.2]  Back pain [M54.9]  Concussion [S06.0X9A]    SUBJECTIVE  Pain Level (on 0 to 10 scale):  3  / 10   Medication Changes/New allergies or changes in medical history, any new surgeries or procedures? NO    If yes, update Summary List   Subjective Functional Status/Changes:  []  No changes reported     Pt reports mostly just discomfort in the low back more so than pain.        OBJECTIVE  Modalities Rationale:     decrease inflammation and decrease pain to improve patient's ability to perform ADLs without pain    10 min [x] Estim, type/location:  IFC to L/S with heat                                    []  att     []  unatt     []  w/US     []  w/ice    []  w/heat    min []  Mechanical Traction: type/lbs                   []  pro   []  sup   []  int   []  cont    []  before manual    []  after manual    min []  Ultrasound, settings/location:      min []  Iontophoresis w/ dexamethasone, location:                                               []  take home patch       []  in clinic   - min []  Ice     [x]  Heat    location/position: L/S    min []  Vasopneumatic Device, press/temp:     min []  Other:    [] Skin assessment post-treatment (if applicable):    []  intact    []  redness- no adverse reaction     []redness - adverse reaction:        20 min Therapeutic Exercise:  [x]  See flow sheet   Rationale:      increase ROM and increase strength to improve the patients ability to perform ADLs without pain     5 min Manual Therapy: Trigger point release to L upper trap and levator    Rationale:      increase ROM and increase tissue extensibility to improve patient's ability to perform ADLs without pain  The manual therapy interventions were performed at a separate and distinct time from the therapeutic activities interventions      10 min Therapeutic Activity: [x]  See flow sheet   Rationale:    functional activities  to improve the patients ability to perform ADLs without pain      15 min Neuromuscular Re-ed: [x]  See flow sheet   Rationale:    improve balance and increase proprioception to improve the patients ability to perform ADLs without pain    Billed With/As:   [x] TE   [] TA   [] Neuro   [] Self Care Patient Education: [x] Review HEP    [] Progressed/Changed HEP based on:   [] positioning   [] body mechanics   [] transfers   [] heat/ice application    [] other:        Other Objective/Functional Measures:    TTP to upper trap    Post Treatment Pain Level (on 0 to 10) scale:   3  / 10     ASSESSMENT  Assessment/Changes in Function:     Pt showing overall improved activity tolerance. Pt still having increased pain in the low back with activity     []  See Progress Note/Recertification   Patient will continue to benefit from skilled PT services to modify and progress therapeutic interventions, address functional mobility deficits, address ROM deficits, address strength deficits and analyze and address soft tissue restrictions to attain remaining goals.    Progress toward goals / Updated goals:    Fair Progress to    [] STG    [x] LTG  1 as shown by still having low back pain with ADLs     PLAN  [x]  Upgrade activities as tolerated YES Continue plan of care   []  Discharge due to :    []  Other:      Therapist: Galina Barakat DPT     Date: 6/21/2021 Time: 4:18 PM        Future Appointments   Date Time Provider Trey An   6/21/2021  4:30 PM Aaron Starr [place] [id number] [facility]   6/24/2021  4:30 PM Krysten Hernández PTA [place] [id number] [facility]   6/28/2021  4:30 PM Aaron Starr [facility]   7/1/2021  4:30 PM Cynthia Shaw PTA [facility]

## 2021-07-14 NOTE — PROGRESS NOTES
[facility] PHYSICAL THERAPY AT [address] [facility] [address], [facility], [address]- Phone: [phone] Fax: [phone]  DISCHARGE SUMMARY  Patient Name: Stella Felipe : 1970   Treatment/Medical Diagnosis: Neck pain [M54.2]  Back pain [M54.9]  Concussion [S06.0X9A]   Referral Source: Essence Richard DO     Date of Initial Visit: 21 Attended Visits: 10 Missed Visits: 10     SUMMARY OF TREATMENT  PT consisted of manual therapy techniques, therapeutic exercises, and modalities to improve strength, improve mobility, decrease pain, and improve overall function. CURRENT STATUS  Pt was unable to be formally reassessed secondary to not returning to PT after 21 visit. Goal/Measure of Progress Goal Met? 1. Patient will be independent with advanced and progressive HEP to prevent reoccurrence of injury and allow for full return to all ADL's. Status at last Eval: Not independent Current Status: Unable to formally reassess no   2. Patient to demonstrate improved L/S and C/S ROM WFL and </= to 2/10 pain to allow for standing and walking > 20 min to allow patient to return to work activities   Status at last Eval: 2-3 Current Status: Unable to formally reassess no   3. Patient will decrease DHI to </= to 39 indicating improved perception of dizziness with ADL's   Status at last Eval: 58 Current Status: Unable to formally reassess no   4. Increase FOTO to >  45 indicating improved functional mobility with ADL's. Status at last Eval: 45 Current Status: Unable to formally reassess progressing       RECOMMENDATIONS  Discontinue therapy due to lack of attendance or compliance. If you have any questions/comments please contact us directly at [phone]. Thank you for allowing us to assist in the care of your patient.     LPTA Signature: Gerardo Pollard Date: 21   Therapist Signature:  Time: 8:40 AM     To ensure we are able to process the patients encounter and avoid risk of your patient receiving a bill for our services, please sign and return this discharge summary by 7-19-21. (30days following DC date)      Physician signature:__________________________   Date: _________                                                                                           Time:__________